# Patient Record
Sex: FEMALE | Race: BLACK OR AFRICAN AMERICAN | NOT HISPANIC OR LATINO | URBAN - METROPOLITAN AREA
[De-identification: names, ages, dates, MRNs, and addresses within clinical notes are randomized per-mention and may not be internally consistent; named-entity substitution may affect disease eponyms.]

---

## 2018-08-16 ENCOUNTER — OUTPATIENT (OUTPATIENT)
Dept: OUTPATIENT SERVICES | Facility: HOSPITAL | Age: 58
LOS: 1 days | End: 2018-08-16
Payer: COMMERCIAL

## 2018-08-16 VITALS
HEART RATE: 67 BPM | TEMPERATURE: 98 F | DIASTOLIC BLOOD PRESSURE: 80 MMHG | RESPIRATION RATE: 16 BRPM | WEIGHT: 220.02 LBS | SYSTOLIC BLOOD PRESSURE: 124 MMHG | HEIGHT: 66 IN

## 2018-08-16 DIAGNOSIS — Z90.710 ACQUIRED ABSENCE OF BOTH CERVIX AND UTERUS: Chronic | ICD-10-CM

## 2018-08-16 DIAGNOSIS — Q83.1 ACCESSORY BREAST: ICD-10-CM

## 2018-08-16 DIAGNOSIS — E11.9 TYPE 2 DIABETES MELLITUS WITHOUT COMPLICATIONS: ICD-10-CM

## 2018-08-16 DIAGNOSIS — R06.83 SNORING: ICD-10-CM

## 2018-08-16 DIAGNOSIS — L72.9 FOLLICULAR CYST OF THE SKIN AND SUBCUTANEOUS TISSUE, UNSPECIFIED: ICD-10-CM

## 2018-08-16 DIAGNOSIS — I10 ESSENTIAL (PRIMARY) HYPERTENSION: ICD-10-CM

## 2018-08-16 LAB
BUN SERPL-MCNC: 13 MG/DL — SIGNIFICANT CHANGE UP (ref 7–23)
CALCIUM SERPL-MCNC: 9.6 MG/DL — SIGNIFICANT CHANGE UP (ref 8.4–10.5)
CHLORIDE SERPL-SCNC: 103 MMOL/L — SIGNIFICANT CHANGE UP (ref 98–107)
CO2 SERPL-SCNC: 27 MMOL/L — SIGNIFICANT CHANGE UP (ref 22–31)
CREAT SERPL-MCNC: 0.88 MG/DL — SIGNIFICANT CHANGE UP (ref 0.5–1.3)
GLUCOSE SERPL-MCNC: 96 MG/DL — SIGNIFICANT CHANGE UP (ref 70–99)
HBA1C BLD-MCNC: 5.7 % — HIGH (ref 4–5.6)
HCT VFR BLD CALC: 37.1 % — SIGNIFICANT CHANGE UP (ref 34.5–45)
HGB BLD-MCNC: 11.9 G/DL — SIGNIFICANT CHANGE UP (ref 11.5–15.5)
MCHC RBC-ENTMCNC: 29.2 PG — SIGNIFICANT CHANGE UP (ref 27–34)
MCHC RBC-ENTMCNC: 32.1 % — SIGNIFICANT CHANGE UP (ref 32–36)
MCV RBC AUTO: 91.2 FL — SIGNIFICANT CHANGE UP (ref 80–100)
NRBC # FLD: 0 — SIGNIFICANT CHANGE UP
PLATELET # BLD AUTO: 269 K/UL — SIGNIFICANT CHANGE UP (ref 150–400)
PMV BLD: 10.9 FL — SIGNIFICANT CHANGE UP (ref 7–13)
POTASSIUM SERPL-MCNC: 3.7 MMOL/L — SIGNIFICANT CHANGE UP (ref 3.5–5.3)
POTASSIUM SERPL-SCNC: 3.7 MMOL/L — SIGNIFICANT CHANGE UP (ref 3.5–5.3)
RBC # BLD: 4.07 M/UL — SIGNIFICANT CHANGE UP (ref 3.8–5.2)
RBC # FLD: 12.2 % — SIGNIFICANT CHANGE UP (ref 10.3–14.5)
SODIUM SERPL-SCNC: 142 MMOL/L — SIGNIFICANT CHANGE UP (ref 135–145)
WBC # BLD: 7.21 K/UL — SIGNIFICANT CHANGE UP (ref 3.8–10.5)
WBC # FLD AUTO: 7.21 K/UL — SIGNIFICANT CHANGE UP (ref 3.8–10.5)

## 2018-08-16 PROCEDURE — 93010 ELECTROCARDIOGRAM REPORT: CPT

## 2018-08-16 NOTE — H&P PST ADULT - HISTORY OF PRESENT ILLNESS
58 y/o female with PMH of HTN and Type 2 DM presents to PST for preoperative evaluation. h/o slow growing left axillary cyst for 1 year. Pt reports a new cyst to the left neck was also noted a few months ago. Scheduled for   Pt reports cysts cause discomfort but have not increased drastically in size. 58 y/o female with PMH of HTN and Type 2 DM presents to PST for preoperative evaluation. Pt reports slow growing left axillary mass for 1 year and she has recently noted a cyst to her left neck a few months ago. Scheduled for Excision Left Cervical Cyst and Left Axillary Accessory Breast [place] on 8/23/2018. Pt reports cysts do cause discomfort but have not increased drastically in size.

## 2018-08-16 NOTE — H&P PST ADULT - NEGATIVE NEUROLOGICAL SYMPTOMS
no tremors/no transient paralysis/no hemiparesis/no facial palsy/no weakness/no generalized seizures/no syncope/no vertigo/no headache/no difficulty walking/no focal seizures/no loss of sensation/no loss of consciousness/no confusion/no paresthesias

## 2018-08-16 NOTE — H&P PST ADULT - PROBLEM SELECTOR PLAN 1
Scheduled for Excision Left Cervical Cyst and Left Axillary Accessory Breast [place] on 8/23/2018  Preop instructions given, pt verbalized understanding  Chlorhexidine wash and GI prophylaxis provided

## 2018-08-16 NOTE — H&P PST ADULT - RS GEN PE MLT RESP DETAILS PC
good air movement/no chest wall tenderness/airway patent/breath sounds equal/respirations non-labored/clear to auscultation bilaterally

## 2018-08-16 NOTE — H&P PST ADULT - PMH
Accessory breast    Cyst of skin  left neck and left axillary accessory breast  HTN (hypertension)    Type 2 diabetes mellitus

## 2018-08-16 NOTE — H&P PST ADULT - ENDOCRINE COMMENTS
Type 2 DM. On oral and injectable hypoglycemics. Avg fasting glucose 114,g/dl Type 2 DM. On oral and injectable hypoglycemics. Avg fasting glucose 114mg/dl

## 2018-08-16 NOTE — H&P PST ADULT - NSANTHOSAYNRD_GEN_A_CORE
No. DA screening performed.  STOP BANG Legend: 0-2 = LOW Risk; 3-4 = INTERMEDIATE Risk; 5-8 = HIGH Risk

## 2018-08-23 ENCOUNTER — OUTPATIENT (OUTPATIENT)
Dept: OUTPATIENT SERVICES | Facility: HOSPITAL | Age: 58
LOS: 1 days | Discharge: HOME | End: 2018-08-23
Payer: COMMERCIAL

## 2018-08-23 VITALS
HEART RATE: 65 BPM | RESPIRATION RATE: 15 BRPM | DIASTOLIC BLOOD PRESSURE: 75 MMHG | OXYGEN SATURATION: 98 % | SYSTOLIC BLOOD PRESSURE: 128 MMHG | TEMPERATURE: 98 F

## 2018-08-23 VITALS
HEART RATE: 82 BPM | TEMPERATURE: 98 F | RESPIRATION RATE: 12 BRPM | HEIGHT: 66 IN | WEIGHT: 220.02 LBS | SYSTOLIC BLOOD PRESSURE: 146 MMHG | DIASTOLIC BLOOD PRESSURE: 81 MMHG

## 2018-08-23 DIAGNOSIS — Q83.1 ACCESSORY BREAST: ICD-10-CM

## 2018-08-23 DIAGNOSIS — Z90.710 ACQUIRED ABSENCE OF BOTH CERVIX AND UTERUS: Chronic | ICD-10-CM

## 2018-08-23 LAB — GLUCOSE BLDC GLUCOMTR-MCNC: 107 MG/DL — HIGH (ref 70–99)

## 2018-08-23 PROCEDURE — 88304 TISSUE EXAM BY PATHOLOGIST: CPT | Mod: 26

## 2018-08-23 RX ORDER — METFORMIN HYDROCHLORIDE 850 MG/1
0 TABLET ORAL
Qty: 0 | Refills: 0 | COMMUNITY

## 2018-08-23 RX ORDER — SEMAGLUTIDE 0.68 MG/ML
0 INJECTION, SOLUTION SUBCUTANEOUS
Qty: 0 | Refills: 0 | COMMUNITY

## 2018-08-23 RX ORDER — ACETAMINOPHEN 500 MG
2 TABLET ORAL
Qty: 0 | Refills: 0 | COMMUNITY

## 2018-08-23 RX ORDER — SODIUM CHLORIDE 9 MG/ML
1000 INJECTION, SOLUTION INTRAVENOUS
Qty: 0 | Refills: 0 | Status: DISCONTINUED | OUTPATIENT
Start: 2018-08-23 | End: 2018-08-24

## 2018-08-23 RX ORDER — SODIUM CHLORIDE 9 MG/ML
3 INJECTION INTRAMUSCULAR; INTRAVENOUS; SUBCUTANEOUS ONCE
Qty: 0 | Refills: 0 | Status: DISCONTINUED | OUTPATIENT
Start: 2018-08-23 | End: 2018-08-24

## 2018-08-23 RX ADMIN — SODIUM CHLORIDE 30 MILLILITER(S): 9 INJECTION, SOLUTION INTRAVENOUS at 11:37

## 2018-08-23 NOTE — ASU DISCHARGE PLAN (ADULT/PEDIATRIC). - CONDITIONS AT DISCHARGE
Good Condition Good Condition  Patient is stable and meets discharge criteria. Patient made aware that he/she must wait on unit to be escorted to awaiting car in front of the main building after being discharged by Anesthesia Department.

## 2018-08-23 NOTE — ASU DISCHARGE PLAN (ADULT/PEDIATRIC). - COMMENTS
Surgical Unit will call you on the next business day to follow up. Surgical [place] is open Monday - Friday.

## 2018-08-23 NOTE — BRIEF OPERATIVE NOTE - OPERATION/FINDINGS
Lipoma excision  08/23/2018  cervical lipoma  Left axillary lipoma   left posterior buttock lipoma excision  [facility]

## 2018-08-23 NOTE — BRIEF OPERATIVE NOTE - PROCEDURE
<<-----Click on this checkbox to enter Procedure Lipoma excision  08/23/2018  cervical lipoma  Left axillary lipoma   left posterior buttock lipoma excision  [facility]

## 2018-08-23 NOTE — ASU DISCHARGE PLAN (ADULT/PEDIATRIC). - MEDICATION SUMMARY - MEDICATIONS TO TAKE
I will START or STAY ON the medications listed below when I get home from the hospital:    Tylenol 500 mg oral tablet  -- 2 tab(s) by mouth every 6 hours, As Needed  -- Indication: For pain meds as tolerated     enalapril 2.5 mg oral tablet  -- 1 tab(s) by mouth once a day  -- Indication: For home med     metFORMIN 750 mg oral tablet, extended release  -- orally 2 times a day  -- Indication: For home med     Ozempic (0.25 mg or 0.5 mg dose) 2 mg/1.5 mL subcutaneous solution  -- subcutaneous once a week on thursday's   -- Indication: For home med

## 2018-08-23 NOTE — ASU DISCHARGE PLAN (ADULT/PEDIATRIC). - NOTIFY
Pain not relieved by Medications/Swelling that continues/Fever greater than 101/Bleeding that does not stop Unable to Urinate/Persistent Nausea and Vomiting/Bleeding that does not stop/Swelling that continues/Pain not relieved by Medications/Fever greater than 101

## 2018-08-23 NOTE — ASU DISCHARGE PLAN (ADULT/PEDIATRIC). - NURSING INSTRUCTIONS
See medication reconciliation record  You were given an antibiotic ( Cefazolin 2000mg ) in OR today about 8:30am  You were given Toradol for pain management. Please DO Not take Motrin/Ibuprofen (NSAIDS) for the next 6 hours (Until __3:30pm_).   You were given IV Tylenol for pain management.  Please DO NOT take tylenol for the next 6-8 hours (after 3pm ). Please do not exceed 3000mg in 24hours.

## 2018-08-23 NOTE — ASU DISCHARGE PLAN (ADULT/PEDIATRIC). - FOLLOWUP APPOINTMENT CLINIC/PHYSICIAN
Dr. Gold Dr. Gold  Call your surgeon's office later today or tomorrow to schedule a follow up appointment.

## 2018-08-28 LAB — SURGICAL PATHOLOGY STUDY: SIGNIFICANT CHANGE UP

## 2020-03-26 NOTE — ASU PATIENT PROFILE, ADULT - NS SC CAGE ALCOHOL GUILTY ABOUT
CHIEF COMPLAINT:  Chief Complaint   Patient presents with   • Establish Care     New patient here to establish care.  Former PCP Dr. Marj Linares.  SHe hasn't had any cholesterol or blood sugar testing done in awhile, which she really wants performed.  (not fasting today).     • CT-Chest     Behing on CT lung screen, was supposed to repeat 07/2019'.  Smokes about less than 1 ppd for the past 50 years, has trid quitting on several occasions.     • Other     Mammogram- Due in April.  GYNE just retired (Dr. Maya).     • Imm/Inj     Declined influenza vaccine.       The patient is unaccompanied.    SUBJECTIVE:  Karlie Bennett is a 72 year old woman who presents to establish care.    Would like to do fasting blood tests.    Mammogram due April. Her gynecologist, Dr. Maya, just retired. Would like referral to new one.    She is a current smoker. She's tried quitting several times. Overdue for CT lung screen - was due to repeat 7/2019.    Thinks she has COPD. She gets some shortness of breath at times. Has been prescribed inhaler but not sure it helps.     Has problems with ear wax. She has tried home removal. Once did at a doctor's office but they didn't know what they were doing and she had a bad experience.    REVIEW OF SYSTEMS:  Review of Systems   Constitutional: Negative for chills and fever.   HENT: Negative for rhinorrhea and sinus pressure.    Eyes: Negative for discharge and redness.   Respiratory: Positive for shortness of breath (at times). Negative for cough and wheezing.    Cardiovascular: Negative for chest pain and palpitations.   Gastrointestinal: Negative for abdominal pain, constipation, diarrhea, nausea and vomiting.   Endocrine: Negative for cold intolerance and heat intolerance.   Genitourinary: Negative for dysuria and frequency.   Skin: Negative for color change and rash.   Psychiatric/Behavioral: Negative for dysphoric mood. The patient is not nervous/anxious.        MEDICATIONS:  Current  Outpatient Medications   Medication   • aspirin 81 MG tablet     No current facility-administered medications for this visit.        ALLERGIES:  ALLERGIES: no known allergies.    PROBLEM LIST:    Patient Active Problem List   Diagnosis   • Female bladder prolapse   • Elevated blood pressure reading   • Cigarette nicotine dependence without complication   • Breast cancer screening   • Colon cancer screening   • Osteoporosis screening   • Chronic obstructive pulmonary disease (COPD) (CMS/[facility])   • Encounter for screening for lung cancer   • Bilateral impacted cerumen       PAST HISTORIES:  PMH:  Past Medical History:   Diagnosis Date   • Female bladder prolapse    • High cholesterol    • No known problems        PSH:  Past Surgical History:   Procedure Laterality Date   • Cataract extraction, bilateral  2018   • Cholecystectomy  2003   • Colonoscopy  2008   • Colonoscopy  2019    Dr. Collier.   • No past surgeries     • Oral surgery procedure  2014   • Tubal ligation  1976   • Tubal ligation         FH:  Family History   Problem Relation Age of Onset   • Heart disease Father    • Cancer, Breast Sister    • Heart disease Brother    • Heart disease Paternal Grandfather 45       SH:  Social History     Socioeconomic History   • Marital status:      Spouse name: Not on file   • Number of children: Not on file   • Years of education: Not on file   • Highest education level: Not on file   Occupational History   • Occupation: Office setting     Comment: Hazards: Break dust   Social Needs   • Financial resource strain: Not on file   • Food insecurity:     Worry: Not on file     Inability: Not on file   • Transportation needs:     Medical: Not on file     Non-medical: Not on file   Tobacco Use   • Smoking status: Current Every Day Smoker     Packs/day: 1.00   • Smokeless tobacco: Never Used   Substance and Sexual Activity   • Alcohol use: Yes     Frequency: Never   • Drug use: No   • Sexual activity: Not Currently     Birth  control/protection: Post-menopausal   Lifestyle   • Physical activity:     Days per week: 0 days     Minutes per session: 0 min   • Stress: Not on file   Relationships   • Social connections:     Talks on phone: Not on file     Gets together: Not on file     Attends [employer] service: Not on file     Active member of club or organization: Not on file     Attends meetings of clubs or organizations: Not on file     Relationship status: Not on file   • Intimate partner violence:     Fear of current or ex partner: Not on file     Emotionally abused: Not on file     Physically abused: Not on file     Forced sexual activity: Not on file   Other Topics Concern   • Not on file   Social History Narrative   • Not on file         OBJECTIVE:    PHYSICAL EXAM:  Visit Vitals  BP (!) 146/82 (BP Location: LUE - Left upper extremity, Patient Position: Sitting, Cuff Size: Regular)   Pulse 76   Temp 98.1 °F (36.7 °C) (Oral)   Resp 16   Ht 5' 2\" (1.575 m)   Wt 64.4 kg (142 lb)   BMI 25.97 kg/m²       Physical Exam  Vitals signs reviewed.   Constitutional:       Appearance: She is well-developed.   HENT:      Right Ear: Tympanic membrane, ear canal and external ear normal.      Left Ear: Tympanic membrane, ear canal and external ear normal.   Cardiovascular:      Rate and Rhythm: Normal rate and regular rhythm.      Heart sounds: Normal heart sounds. No murmur.   Pulmonary:      Effort: Pulmonary effort is normal.      Breath sounds: Normal breath sounds. No wheezing.   Abdominal:      General: Bowel sounds are normal. There is no distension.      Palpations: Abdomen is soft. There is no mass.      Tenderness: There is no abdominal tenderness.   Skin:     General: Skin is warm.      Findings: No rash.   Neurological:      Mental Status: She is alert and oriented to person, place, and time.      Cranial Nerves: No cranial nerve deficit.   Psychiatric:         Behavior: Behavior normal.         Thought Content: Thought content normal.          Judgment: Judgment normal.         LABORATORY TESTS:  No visits with results within 1 Month(s) from this visit.   Latest known visit with results is:   Orders Only on 2019   Component Date Value   • Pathology Report 2019                      Value:Name: CHASE LYON             MRN:     [id number]    /Age:1947 (Age: 71)            Visit#:  [id number]    Sex:F                        Surgical Pathology Report        Client: [facility]                      Submitting Physician: Chang Collier MD        Additional Physician(s): Marj Varela MD    Date Specimen Collected: 19             Accession #:  [id number]    Date Specimen Received:  19                 Date Reported:           3/1/2019 13:49      Location:  [facility]        ______________________________________________________________________________    Pathologic Diagnosis :        A: Descending colon polyps:    -  Fragments of tubular adenoma        B: Sigmoid colon polyps:    -  Fragments of tubular adenoma        Oanh Johnson M.D.    ** Electronic Signature (ASA) 3/1/2019 13:49 **    ______________________________________________________________________________        Clinical Information:    History of polyps; polyps; divert                          iculosis         Specimen(s) Submitted:     A:  Descending colon polyps    B:  Sigmoid colon polyps        Gross Description:    A: The specimen is received in formalin with proper patient identification    labeled \"descending colon polyp biopsy\" and consists of three pieces of    pink-tan tissue measuring 0.6 cm, 0.6 cm, and 0.7 cm.  Sections submitted:        A1: Entire specimen in screen cassette        B: The specimen is received in formalin with proper patient identification    labeled \"sigmoid colon polyp\" and consists of two pieces of pink-tan tissue    measuring 0.3 cm and 0.5 cm.  Sections submitted:             B1: Entire specimen in screen  cassette        ABL 2/28/2019 02:09 PM             Microscopic Description:        The attending pathologist whose signature appears on this report has reviewed    the diagnostic studies and has edited the gross and/or microscopic portion of    the report rendering the final diagnosis.        The immunohistochemical, FISH, or TERRI reagents                           (if any) utilized in this test    were developed and their performance characteristics determined by [facility]    Laboratories.  Some of the immunohistochemical reagents have not been cleared    or approved by the U.S. Food and Drug Administration. The FDA has determined    that such clearance or approval is not necessary.  This test is used for    clinical purposes.  It should not be regarded as investigational or for    research.  This laboratory is certified under the Clinical Laboratory    Improvement Amendments of 1988 (CLIA) as qualified to perform high complexity    clinical laboratory testing.  The appropriate controls were run and show    appropriate reactivity. Since FISH and/or immunohistochemistry for estrogen    receptor, progesterone receptor, and HER2/david have not been validated on    decalcified tissue, such results should be interpreted with caution given the    likelihood of false negativity.        Fee Codes:     A: [id number], [id number]     B: [id number], [id number]                                  Performing Lab Location (Unless otherwise specified):    [address] [address]                 ASSESSMENT/PLAN:  Elevated blood pressure reading  Recheck at next visit  - CBC WITH DIFFERENTIAL; Future  - COMPREHENSIVE METABOLIC PANEL; Future  - LIPID PANEL WITH REFLEX; Future    Cigarette nicotine dependence without complication  I urged the patient to quit smoking.      Breast cancer screening  Mammogram ordered  - MAMMO SCREENING BILATERAL W MOOSE; Future    Colon cancer screening  EMR:  Colonoscopy, 2/27/2019, Dr. Collier. - fragments of tubular adenoma. Rec to repeat in 3 years: 2/2022.    Osteoporosis screening  Check DEXA  - BD DEXA AXIAL SKELETON; Future    Encounter for screening for lung cancer  >30 pack-years  Discussed risks and benefits - she opts to do  - CT LUNG CANCER SCREENING LOW DOSE WO CONTRAST; Future    Need for hepatitis C screening test  Check  - HEPATITIS C ANTIBODY WITH REFLEX; Future    Routine health maintenance  Refer  - SERVICE TO OB GYN    Personal history of nicotine dependence     - CT LUNG CANCER SCREENING LOW DOSE WO CONTRAST; Future    Encounter for screening mammogram for malignant neoplasm of breast     - MAMMO SCREENING BILATERAL W MOOSE; Future    Overweight (BMI 25.0-29.9)     - LIPID PANEL WITH REFLEX; Future    Bilateral impacted cerumen  Offer irrigation in future - defer today due to COVID-19 pandemic    Postmenopausal estrogen deficiency     - BD DEXA AXIAL SKELETON; Future    May have COPD  PFTs in future - defer today due to COVID-19 pandemic    PCV13 in future    Recommended that she receive the RZV vaccine. Suggest that she call insurance to verify coverage. Advised that we can give in the office, or she can receive it at pharmacy.       Orders Placed This Encounter   • CT LUNG CANCER SCREENING LOW DOSE WO CONTRAST   • MAMMO SCREENING BILATERAL W MOOSE   • BD DEXA SCAN AXIAL SKELETON   • CBC with Automated Differential   • Comprehensive Metabolic Panel   • Lipid Panel With Reflex   • Hepatitis C Antibody With Reflex   • SERVICE TO OB GYN       Return in about 3 months (around 6/26/2020) for recheck blood pressure.    Kristian Davison MD  3/26/2020         no